# Patient Record
Sex: FEMALE | Race: WHITE
[De-identification: names, ages, dates, MRNs, and addresses within clinical notes are randomized per-mention and may not be internally consistent; named-entity substitution may affect disease eponyms.]

---

## 2020-05-13 ENCOUNTER — HOSPITAL ENCOUNTER (OUTPATIENT)
Dept: HOSPITAL 33 - SDC-PAIN | Age: 55
Discharge: HOME | End: 2020-05-13
Attending: PSYCHIATRY & NEUROLOGY
Payer: MEDICARE

## 2020-05-13 DIAGNOSIS — E11.9: ICD-10-CM

## 2020-05-13 DIAGNOSIS — I10: ICD-10-CM

## 2020-05-13 DIAGNOSIS — Z79.01: ICD-10-CM

## 2020-05-13 DIAGNOSIS — G90.522: Primary | ICD-10-CM

## 2020-05-13 DIAGNOSIS — Z79.899: ICD-10-CM

## 2020-05-13 DIAGNOSIS — J44.9: ICD-10-CM

## 2020-05-13 LAB
INR PPP: 1.07 (ref 0.8–3)
PROTHROMBIN TIME: 12.1 SECONDS (ref 9.95–12.35)

## 2020-05-13 PROCEDURE — 77002 NEEDLE LOCALIZATION BY XRAY: CPT

## 2020-05-13 PROCEDURE — 85610 PROTHROMBIN TIME: CPT

## 2020-05-13 PROCEDURE — 36415 COLL VENOUS BLD VENIPUNCTURE: CPT

## 2020-05-13 PROCEDURE — 82962 GLUCOSE BLOOD TEST: CPT

## 2020-05-13 PROCEDURE — 64520 N BLOCK LUMBAR/THORACIC: CPT

## 2020-05-13 PROCEDURE — 72100 X-RAY EXAM L-S SPINE 2/3 VWS: CPT

## 2020-05-13 NOTE — XRAY
Indication: Left lumbar sympathetic nerve block.



Intraoperative fluoroscopy was provided for 48 seconds.  3 digital spot images

submitted for interpretation demonstrates left posterior needle tip projecting

just anterior to I believe the L3 segment.  Small amount of contrast injected

for needle tip placement.  Correlate with intraoperative findings/report.

## 2020-06-24 ENCOUNTER — HOSPITAL ENCOUNTER (OUTPATIENT)
Dept: HOSPITAL 33 - SDC-PAIN | Age: 55
Discharge: HOME | End: 2020-06-24
Attending: PSYCHIATRY & NEUROLOGY
Payer: MEDICARE

## 2020-06-24 DIAGNOSIS — G90.522: Primary | ICD-10-CM

## 2020-06-24 DIAGNOSIS — E11.9: ICD-10-CM

## 2020-06-24 DIAGNOSIS — Z79.01: ICD-10-CM

## 2020-06-24 DIAGNOSIS — J44.9: ICD-10-CM

## 2020-06-24 DIAGNOSIS — I10: ICD-10-CM

## 2020-06-24 DIAGNOSIS — Z79.899: ICD-10-CM

## 2020-06-24 LAB
INR PPP: 1.04 (ref 0.8–3)
PROTHROMBIN TIME: 11.8 SECONDS (ref 9.95–12.35)

## 2020-06-24 PROCEDURE — 77002 NEEDLE LOCALIZATION BY XRAY: CPT

## 2020-06-24 PROCEDURE — 82962 GLUCOSE BLOOD TEST: CPT

## 2020-06-24 PROCEDURE — 64520 N BLOCK LUMBAR/THORACIC: CPT

## 2020-06-24 PROCEDURE — 85610 PROTHROMBIN TIME: CPT

## 2020-06-24 PROCEDURE — 36415 COLL VENOUS BLD VENIPUNCTURE: CPT

## 2020-06-24 PROCEDURE — 72100 X-RAY EXAM L-S SPINE 2/3 VWS: CPT

## 2020-06-24 NOTE — XRAY
Indication: Left lumbar sympathetic nerve block.



Intraoperative fluoroscopy was provided for 45 seconds.  4 digital spot images

submitted for interpretation demonstrates left posterior needle tip projecting

just anterior to I believe the L2 segment.  Small amount of contrast injected

for needle tip placement.  Correlate with intraoperative findings/report.

## 2020-07-22 ENCOUNTER — HOSPITAL ENCOUNTER (OUTPATIENT)
Dept: HOSPITAL 33 - SDC-PAIN | Age: 55
Discharge: HOME | End: 2020-07-22
Attending: PSYCHIATRY & NEUROLOGY
Payer: MEDICARE

## 2020-07-22 DIAGNOSIS — J44.9: ICD-10-CM

## 2020-07-22 DIAGNOSIS — Z79.899: ICD-10-CM

## 2020-07-22 DIAGNOSIS — I10: ICD-10-CM

## 2020-07-22 DIAGNOSIS — H40.9: ICD-10-CM

## 2020-07-22 DIAGNOSIS — M47.816: Primary | ICD-10-CM

## 2020-07-22 DIAGNOSIS — R60.0: ICD-10-CM

## 2020-07-22 DIAGNOSIS — E11.9: ICD-10-CM

## 2020-07-22 LAB
INR PPP: 1.06 (ref 0.8–3)
PROTHROMBIN TIME: 12 SECONDS (ref 9.95–12.35)

## 2020-07-22 PROCEDURE — 84703 CHORIONIC GONADOTROPIN ASSAY: CPT

## 2020-07-22 PROCEDURE — 85610 PROTHROMBIN TIME: CPT

## 2020-07-22 PROCEDURE — 77002 NEEDLE LOCALIZATION BY XRAY: CPT

## 2020-07-22 PROCEDURE — 82962 GLUCOSE BLOOD TEST: CPT

## 2020-07-22 PROCEDURE — 64494 INJ PARAVERT F JNT L/S 2 LEV: CPT

## 2020-07-22 PROCEDURE — 64495 INJ PARAVERT F JNT L/S 3 LEV: CPT

## 2020-07-22 PROCEDURE — 72020 X-RAY EXAM OF SPINE 1 VIEW: CPT

## 2020-07-22 PROCEDURE — 64493 INJ PARAVERT F JNT L/S 1 LEV: CPT

## 2020-07-22 PROCEDURE — 36415 COLL VENOUS BLD VENIPUNCTURE: CPT

## 2020-07-25 NOTE — XRAY
Indication: Bilateral L3-S1 MBB.



Intraoperative fluoroscopy was provided for19 seconds.  A single digital PA

spot image demonstrates posterior spinal needle tips projected over the

expected course of the left and right L3-S1 nerve roots.  Correlate with

intraoperative findings/report.

## 2020-10-14 ENCOUNTER — HOSPITAL ENCOUNTER (OUTPATIENT)
Dept: HOSPITAL 33 - SDC-PAIN | Age: 55
Discharge: HOME | End: 2020-10-14
Attending: PSYCHIATRY & NEUROLOGY
Payer: MEDICARE

## 2020-10-14 DIAGNOSIS — H40.9: ICD-10-CM

## 2020-10-14 DIAGNOSIS — E11.9: ICD-10-CM

## 2020-10-14 DIAGNOSIS — M47.816: Primary | ICD-10-CM

## 2020-10-14 DIAGNOSIS — I10: ICD-10-CM

## 2020-10-14 DIAGNOSIS — J44.9: ICD-10-CM

## 2020-10-14 DIAGNOSIS — Z79.01: ICD-10-CM

## 2020-10-14 DIAGNOSIS — Z79.899: ICD-10-CM

## 2020-10-14 LAB
INR PPP: 1.07 (ref 0.8–3)
PROTHROMBIN TIME: 12.1 SECONDS (ref 9.95–12.35)

## 2020-10-14 PROCEDURE — 64636 DESTROY L/S FACET JNT ADDL: CPT

## 2020-10-14 PROCEDURE — 77002 NEEDLE LOCALIZATION BY XRAY: CPT

## 2020-10-14 PROCEDURE — 64635 DESTROY LUMB/SAC FACET JNT: CPT

## 2020-10-14 PROCEDURE — 72100 X-RAY EXAM L-S SPINE 2/3 VWS: CPT

## 2020-10-14 PROCEDURE — 36415 COLL VENOUS BLD VENIPUNCTURE: CPT

## 2020-10-14 PROCEDURE — 85610 PROTHROMBIN TIME: CPT

## 2020-10-14 PROCEDURE — 82962 GLUCOSE BLOOD TEST: CPT

## 2020-10-14 NOTE — XRAY
Indication: Left L3-S1 RFA.



Intraoperative fluoroscopy was provided for 22 seconds.  3 digital spot images

submitted for interpretation demonstrates posterior needle tips projecting

over the expected left L3-S1 nerve roots.  Correlate with intraoperative

findings/report.

## 2020-11-18 ENCOUNTER — HOSPITAL ENCOUNTER (OUTPATIENT)
Dept: HOSPITAL 33 - SDC-PAIN | Age: 55
Discharge: HOME | End: 2020-11-18
Attending: PSYCHIATRY & NEUROLOGY
Payer: SELF-PAY

## 2020-11-18 DIAGNOSIS — J44.9: ICD-10-CM

## 2020-11-18 DIAGNOSIS — I10: ICD-10-CM

## 2020-11-18 DIAGNOSIS — E11.9: ICD-10-CM

## 2020-11-18 DIAGNOSIS — Z79.01: ICD-10-CM

## 2020-11-18 DIAGNOSIS — Z79.899: ICD-10-CM

## 2020-11-18 DIAGNOSIS — R60.9: ICD-10-CM

## 2020-11-18 DIAGNOSIS — M47.816: Primary | ICD-10-CM

## 2020-11-18 LAB
INR PPP: 1.11 (ref 0.8–3)
PROTHROMBIN TIME: 12.5 SECONDS (ref 9.95–12.35)

## 2020-11-18 PROCEDURE — 72100 X-RAY EXAM L-S SPINE 2/3 VWS: CPT

## 2020-11-18 PROCEDURE — 64636 DESTROY L/S FACET JNT ADDL: CPT

## 2020-11-18 PROCEDURE — 85610 PROTHROMBIN TIME: CPT

## 2020-11-18 PROCEDURE — 36415 COLL VENOUS BLD VENIPUNCTURE: CPT

## 2020-11-18 PROCEDURE — 77002 NEEDLE LOCALIZATION BY XRAY: CPT

## 2020-11-18 PROCEDURE — 64635 DESTROY LUMB/SAC FACET JNT: CPT

## 2020-11-18 PROCEDURE — 84703 CHORIONIC GONADOTROPIN ASSAY: CPT

## 2020-11-18 PROCEDURE — 82947 ASSAY GLUCOSE BLOOD QUANT: CPT

## 2020-11-18 PROCEDURE — 82962 GLUCOSE BLOOD TEST: CPT

## 2020-11-18 NOTE — XRAY
Indication: Right L3-S1 RFA.



Intraoperative fluoroscopy was provided for 45 seconds.  3 digital spot images

submitted for interpretation demonstrates posterior needle tips projecting

over the expected right L3-S1 nerve roots.  Correlate with intraoperative

findings/report.

## 2021-04-14 ENCOUNTER — HOSPITAL ENCOUNTER (OUTPATIENT)
Dept: HOSPITAL 33 - SDC-PAIN | Age: 56
Discharge: HOME | End: 2021-04-14
Attending: PSYCHIATRY & NEUROLOGY
Payer: MEDICARE

## 2021-04-14 DIAGNOSIS — J44.9: ICD-10-CM

## 2021-04-14 DIAGNOSIS — Z79.01: ICD-10-CM

## 2021-04-14 DIAGNOSIS — E11.9: ICD-10-CM

## 2021-04-14 DIAGNOSIS — I10: ICD-10-CM

## 2021-04-14 DIAGNOSIS — R60.9: ICD-10-CM

## 2021-04-14 DIAGNOSIS — M54.16: Primary | ICD-10-CM

## 2021-04-14 DIAGNOSIS — H40.9: ICD-10-CM

## 2021-04-14 DIAGNOSIS — Z79.899: ICD-10-CM

## 2021-04-14 LAB
INR PPP: 1.14 (ref 0.8–3)
PROTHROMBIN TIME: 12.9 SECONDS (ref 9.95–12.35)

## 2021-04-14 PROCEDURE — 77003 FLUOROGUIDE FOR SPINE INJECT: CPT

## 2021-04-14 PROCEDURE — 82947 ASSAY GLUCOSE BLOOD QUANT: CPT

## 2021-04-14 PROCEDURE — 85610 PROTHROMBIN TIME: CPT

## 2021-04-14 PROCEDURE — 64483 NJX AA&/STRD TFRM EPI L/S 1: CPT

## 2021-04-14 PROCEDURE — 36415 COLL VENOUS BLD VENIPUNCTURE: CPT

## 2021-04-14 PROCEDURE — 64484 NJX AA&/STRD TFRM EPI L/S EA: CPT

## 2021-04-14 PROCEDURE — 72100 X-RAY EXAM L-S SPINE 2/3 VWS: CPT

## 2021-04-15 NOTE — XRAY
Indication: Left L4-S1 transforaminal PATRICIO.



Intraoperative fluoroscopy provided for 40 seconds.  2 digital spot images

submitted for interpretation demonstrates posterior needle tips projecting

over the expected left L4 and L5 nerve roots.  Small amount of contrast

injected for needle tip placement.  Correlate with intraoperative

findings/report.

## 2022-09-07 ENCOUNTER — HOSPITAL ENCOUNTER (OUTPATIENT)
Dept: HOSPITAL 33 - SDC-PAIN | Age: 57
End: 2022-09-07
Attending: PSYCHIATRY & NEUROLOGY
Payer: MEDICARE

## 2022-09-07 DIAGNOSIS — Z53.8: Primary | ICD-10-CM

## 2022-09-07 DIAGNOSIS — E11.9: ICD-10-CM

## 2022-09-07 PROCEDURE — 82947 ASSAY GLUCOSE BLOOD QUANT: CPT

## 2022-09-28 ENCOUNTER — HOSPITAL ENCOUNTER (OUTPATIENT)
Dept: HOSPITAL 33 - SDC-PAIN | Age: 57
Discharge: HOME | End: 2022-09-28
Attending: PSYCHIATRY & NEUROLOGY
Payer: MEDICARE

## 2022-09-28 DIAGNOSIS — M54.16: Primary | ICD-10-CM

## 2022-09-28 DIAGNOSIS — E11.9: ICD-10-CM

## 2022-09-28 DIAGNOSIS — Z79.899: ICD-10-CM

## 2022-09-28 PROCEDURE — 64484 NJX AA&/STRD TFRM EPI L/S EA: CPT

## 2022-09-28 PROCEDURE — 82947 ASSAY GLUCOSE BLOOD QUANT: CPT

## 2022-09-28 PROCEDURE — 72100 X-RAY EXAM L-S SPINE 2/3 VWS: CPT

## 2022-09-28 PROCEDURE — 77003 FLUOROGUIDE FOR SPINE INJECT: CPT

## 2022-09-28 PROCEDURE — 64483 NJX AA&/STRD TFRM EPI L/S 1: CPT

## 2022-09-28 NOTE — XRAY
Indication: Left L4-S1 transforaminal PATRICIO.



Intraoperative fluoroscopy provided for 55 seconds.  6 digital spot image

submitted for interpretation demonstrates posterior needle tips projecting

over the expected left L4 and L5 nerve roots.  Small amount of contrast

injected for needle tip placement.  Correlate with intraoperative

findings/report.

## 2023-04-19 ENCOUNTER — HOSPITAL ENCOUNTER (OUTPATIENT)
Dept: HOSPITAL 33 - SDC-PAIN | Age: 58
Discharge: HOME | End: 2023-04-19
Attending: PSYCHIATRY & NEUROLOGY
Payer: MEDICARE

## 2023-04-19 DIAGNOSIS — Z79.899: ICD-10-CM

## 2023-04-19 DIAGNOSIS — M17.0: Primary | ICD-10-CM

## 2023-04-19 DIAGNOSIS — E11.9: ICD-10-CM

## 2023-04-19 PROCEDURE — 77002 NEEDLE LOCALIZATION BY XRAY: CPT

## 2023-04-19 PROCEDURE — 73560 X-RAY EXAM OF KNEE 1 OR 2: CPT

## 2023-04-19 PROCEDURE — 82947 ASSAY GLUCOSE BLOOD QUANT: CPT

## 2023-04-19 PROCEDURE — 20610 DRAIN/INJ JOINT/BURSA W/O US: CPT

## 2023-04-19 NOTE — XRAY
Indication: Left knee injection.



Intraoperative fluoroscopy provided 6 seconds.  Single digital spot image

submitted for interpretation demonstrates needle tip projecting over the left

femur intracondylar notch.  Small amount of contrast injected for needle tip

placement roots.  Correlate with intraoperative findings/report.

## 2023-04-19 NOTE — XRAY
Indication: Right knee injection.



Intraoperative fluoroscopy provided 9 seconds.  Single digital spot image

submitted for interpretation demonstrates needle tip projecting over the right

femur intracondylar notch.  Small amount of contrast injected for needle tip

placement roots.  Correlate with intraoperative findings/report.

## 2023-11-30 ENCOUNTER — HOSPITAL ENCOUNTER (OUTPATIENT)
Dept: HOSPITAL 33 - SDC-PAIN | Age: 58
Discharge: HOME | End: 2023-11-30
Attending: PSYCHIATRY & NEUROLOGY
Payer: MEDICARE

## 2023-11-30 DIAGNOSIS — E11.9: ICD-10-CM

## 2023-11-30 DIAGNOSIS — M75.52: Primary | ICD-10-CM

## 2023-11-30 DIAGNOSIS — M19.012: ICD-10-CM

## 2023-11-30 PROCEDURE — 20610 DRAIN/INJ JOINT/BURSA W/O US: CPT

## 2023-11-30 PROCEDURE — 73030 X-RAY EXAM OF SHOULDER: CPT

## 2023-11-30 PROCEDURE — 82947 ASSAY GLUCOSE BLOOD QUANT: CPT

## 2023-11-30 PROCEDURE — 77002 NEEDLE LOCALIZATION BY XRAY: CPT

## 2023-11-30 NOTE — XRAY
Indication: Left shoulder and subacromial bursa injection.



Intraoperative fluoroscopy provided for 11 seconds.  3 digital spot image

submitted for interpretation demonstrates needle tip projecting over left

glenohumeral joint superiorly.  Second needle tip subacromial.  Small amount

of contrast injected for needle placement.  Correlate with intraoperative

findings/report.

## 2023-12-01 NOTE — XRAY
11 seconds of fluoroscopy was used in surgery for a left intra-artictular and

subacrominal bursa shoulder injection.

## 2023-12-20 ENCOUNTER — HOSPITAL ENCOUNTER (OUTPATIENT)
Dept: HOSPITAL 33 - SDC-PAIN | Age: 58
Discharge: HOME | End: 2023-12-20
Attending: PSYCHIATRY & NEUROLOGY
Payer: MEDICARE

## 2023-12-20 DIAGNOSIS — Z79.899: ICD-10-CM

## 2023-12-20 DIAGNOSIS — M47.817: Primary | ICD-10-CM

## 2023-12-20 DIAGNOSIS — E11.9: ICD-10-CM

## 2023-12-20 PROCEDURE — 82947 ASSAY GLUCOSE BLOOD QUANT: CPT

## 2023-12-20 PROCEDURE — 72100 X-RAY EXAM L-S SPINE 2/3 VWS: CPT

## 2023-12-20 PROCEDURE — 64636 DESTROY L/S FACET JNT ADDL: CPT

## 2023-12-20 PROCEDURE — 64635 DESTROY LUMB/SAC FACET JNT: CPT

## 2023-12-20 PROCEDURE — 77002 NEEDLE LOCALIZATION BY XRAY: CPT

## 2024-01-17 ENCOUNTER — HOSPITAL ENCOUNTER (OUTPATIENT)
Dept: HOSPITAL 33 - SDC-PAIN | Age: 59
Discharge: HOME | End: 2024-01-17
Attending: PSYCHIATRY & NEUROLOGY
Payer: MEDICARE

## 2024-01-17 DIAGNOSIS — Z79.899: ICD-10-CM

## 2024-01-17 DIAGNOSIS — E11.9: ICD-10-CM

## 2024-01-17 DIAGNOSIS — M47.816: Primary | ICD-10-CM

## 2024-01-17 PROCEDURE — 82947 ASSAY GLUCOSE BLOOD QUANT: CPT

## 2024-01-17 PROCEDURE — 72100 X-RAY EXAM L-S SPINE 2/3 VWS: CPT

## 2024-01-17 PROCEDURE — 64635 DESTROY LUMB/SAC FACET JNT: CPT

## 2024-01-17 PROCEDURE — 77002 NEEDLE LOCALIZATION BY XRAY: CPT

## 2024-01-17 PROCEDURE — 64636 DESTROY L/S FACET JNT ADDL: CPT

## 2024-01-17 NOTE — XRAY
Indication: Right L4-S1 RFA.



Intraoperative fluoroscopy provided for 19 seconds.  4 digital spot images

submitted for interpretation demonstrates posterior needle tips projecting

over the expected right L4-S1 nerve roots.  Correlate with intraoperative

findings/report.